# Patient Record
Sex: MALE | Race: WHITE | HISPANIC OR LATINO | Employment: PART TIME | URBAN - METROPOLITAN AREA
[De-identification: names, ages, dates, MRNs, and addresses within clinical notes are randomized per-mention and may not be internally consistent; named-entity substitution may affect disease eponyms.]

---
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
## 2023-05-23 ENCOUNTER — HOSPITAL ENCOUNTER (EMERGENCY)
Facility: HOSPITAL | Age: 18
Discharge: HOME | End: 2023-05-24
Attending: EMERGENCY MEDICINE | Admitting: EMERGENCY MEDICINE
Payer: OTHER MISCELLANEOUS

## 2023-05-23 VITALS
WEIGHT: 246 LBS | BODY MASS INDEX: 35.22 KG/M2 | RESPIRATION RATE: 15 BRPM | SYSTOLIC BLOOD PRESSURE: 116 MMHG | TEMPERATURE: 97.6 F | OXYGEN SATURATION: 100 % | HEART RATE: 93 BPM | HEIGHT: 70 IN | DIASTOLIC BLOOD PRESSURE: 86 MMHG

## 2023-05-23 DIAGNOSIS — S90.31XA CONTUSION OF RIGHT FOOT, INITIAL ENCOUNTER: Primary | ICD-10-CM

## 2023-05-23 PROCEDURE — 99283 EMERGENCY DEPT VISIT LOW MDM: CPT

## 2023-05-23 NOTE — Clinical Note
[place]  [facility] EMERGENCY DEPARTMENT  [address]  [place] [address]  Phone: [phone]    Yair Doshi was seen and treated in our emergency department on 5/23/2023.  He may return to work on 05/27/2023.         Thank you for choosing [facility].    Keshia Moore RN

## 2023-05-24 ENCOUNTER — APPOINTMENT (OUTPATIENT)
Dept: GENERAL RADIOLOGY | Facility: HOSPITAL | Age: 18
End: 2023-05-24
Payer: OTHER MISCELLANEOUS

## 2023-05-24 PROCEDURE — 73630 X-RAY EXAM OF FOOT: CPT

## 2023-05-24 RX ORDER — IBUPROFEN 400 MG/1
800 TABLET ORAL ONCE
Status: COMPLETED | OUTPATIENT
Start: 2023-05-24 | End: 2023-05-24

## 2023-05-24 RX ADMIN — IBUPROFEN 800 MG: 400 TABLET ORAL at 01:18

## 2023-05-24 NOTE — ED PROVIDER NOTES
Subjective   History of Present Illness  18-year-old male presents with right foot injury.  Patient states that he dropped a piece of lumber on his foot that weighed roughly 60 pounds.  This happened about an hour prior to arrival.  He has pain and swelling to dorsal aspect of midfoot.  No lacerations or abrasions.  Patient was wearing leather steel toed boots at that time.  No pain to ankle.  Patient is able to ambulate but states it is painful.  No medications prior to arrival.        Review of Systems   Constitutional: Negative.    Respiratory: Negative.    Cardiovascular: Negative.    Musculoskeletal:        Per HPI, otherwise negative   Skin: Negative.    Neurological: Negative.        History reviewed. No pertinent past medical history.    Allergies   Allergen Reactions   • Penicillins Rash       Past Surgical History:   Procedure Laterality Date   • HYPOSPADIAS CORRECTION     • HYPOSPADIAS CORRECTION         Family History   Problem Relation Age of Onset   • No Known Problems Mother    • No Known Problems Father        Social History     Socioeconomic History   • Marital status: Single   Tobacco Use   • Smoking status: Never   • Smokeless tobacco: Never   Vaping Use   • Vaping Use: Never used   Substance and Sexual Activity   • Alcohol use: Never   • Drug use: Defer   • Sexual activity: Defer           Objective   Physical Exam  Constitutional:       General: He is not in acute distress.     Appearance: He is not toxic-appearing.   HENT:      Head: Normocephalic and atraumatic.   Cardiovascular:      Rate and Rhythm: Normal rate and regular rhythm.      Pulses: Normal pulses.   Pulmonary:      Effort: Pulmonary effort is normal. No respiratory distress.   Musculoskeletal:      Comments: Right foot with swelling and tenderness to dorsal midfoot.  No lacerations or abrasions.  No gross deformities.  Toes are well-perfused.  2+ DP and PT pulse.  No tenderness to ankle.  Sensation light touch intact throughout.      Skin:     General: Skin is warm and dry.   Neurological:      General: No focal deficit present.      Mental Status: He is alert and oriented to person, place, and time. Mental status is at baseline.   Psychiatric:         Mood and Affect: Mood normal.         Behavior: Behavior normal.         Thought Content: Thought content normal.         Judgment: Judgment normal.         Procedures           ED Course  ED Course as of 05/24/23 0045   Wed May 24, 2023   0045 X-ray negative.  Advised anti-inflammatories, ice, weightbearing as tolerated.  Discussed expected course and return precautions.  Advised podiatry follow-up in 1 to 2 weeks if still symptomatic. [TD]      ED Course User Index  [TD] Alverto Stafford MD                                           [facility]    Final diagnoses:   Contusion of right foot, initial encounter       ED Disposition  ED Disposition     ED Disposition   Discharge    Condition   Stable    Comment   --             Hector Rubio DPM  [address]  [place] [address]  [phone]    In 1 week  As needed         Medication List      No changes were made to your prescriptions during this visit.          Alverto Stafford MD  05/24/23 0045

## 2024-01-18 ENCOUNTER — HOSPITAL ENCOUNTER (OUTPATIENT)
Dept: GENERAL RADIOLOGY | Facility: HOSPITAL | Age: 19
Discharge: HOME | End: 2024-01-18
Admitting: NURSE PRACTITIONER
Payer: COMMERCIAL

## 2024-01-18 DIAGNOSIS — M79.642 LEFT HAND PAIN: ICD-10-CM

## 2024-01-18 PROCEDURE — 73130 X-RAY EXAM OF HAND: CPT
